# Patient Record
Sex: FEMALE | Race: WHITE | NOT HISPANIC OR LATINO | Employment: FULL TIME | URBAN - METROPOLITAN AREA
[De-identification: names, ages, dates, MRNs, and addresses within clinical notes are randomized per-mention and may not be internally consistent; named-entity substitution may affect disease eponyms.]

---

## 2021-05-26 ENCOUNTER — RECORDS - HEALTHEAST (OUTPATIENT)
Dept: ADMINISTRATIVE | Facility: CLINIC | Age: 51
End: 2021-05-26

## 2021-05-27 ENCOUNTER — RECORDS - HEALTHEAST (OUTPATIENT)
Dept: ADMINISTRATIVE | Facility: CLINIC | Age: 51
End: 2021-05-27

## 2021-05-28 ENCOUNTER — RECORDS - HEALTHEAST (OUTPATIENT)
Dept: ADMINISTRATIVE | Facility: CLINIC | Age: 51
End: 2021-05-28

## 2021-05-29 ENCOUNTER — RECORDS - HEALTHEAST (OUTPATIENT)
Dept: ADMINISTRATIVE | Facility: CLINIC | Age: 51
End: 2021-05-29

## 2024-04-26 ENCOUNTER — APPOINTMENT (OUTPATIENT)
Dept: RADIOLOGY | Facility: CLINIC | Age: 54
End: 2024-04-26
Attending: EMERGENCY MEDICINE
Payer: COMMERCIAL

## 2024-04-26 ENCOUNTER — APPOINTMENT (OUTPATIENT)
Dept: CT IMAGING | Facility: CLINIC | Age: 54
End: 2024-04-26
Attending: EMERGENCY MEDICINE
Payer: COMMERCIAL

## 2024-04-26 ENCOUNTER — HOSPITAL ENCOUNTER (EMERGENCY)
Facility: CLINIC | Age: 54
Discharge: HOME | End: 2024-04-27
Attending: EMERGENCY MEDICINE | Admitting: EMERGENCY MEDICINE
Payer: COMMERCIAL

## 2024-04-26 DIAGNOSIS — S83.411A SPRAIN OF MEDIAL COLLATERAL LIGAMENT OF RIGHT KNEE, INITIAL ENCOUNTER: ICD-10-CM

## 2024-04-26 PROCEDURE — 99284 EMERGENCY DEPT VISIT MOD MDM: CPT | Mod: 25

## 2024-04-26 PROCEDURE — 73700 CT LOWER EXTREMITY W/O DYE: CPT | Mod: RT

## 2024-04-26 PROCEDURE — 73562 X-RAY EXAM OF KNEE 3: CPT | Mod: RT

## 2024-04-26 PROCEDURE — 250N000013 HC RX MED GY IP 250 OP 250 PS 637: Performed by: EMERGENCY MEDICINE

## 2024-04-26 RX ORDER — IBUPROFEN 600 MG/1
600 TABLET, FILM COATED ORAL ONCE
Status: COMPLETED | OUTPATIENT
Start: 2024-04-26 | End: 2024-04-26

## 2024-04-26 RX ADMIN — IBUPROFEN 600 MG: 600 TABLET ORAL at 23:19

## 2024-04-26 ASSESSMENT — ACTIVITIES OF DAILY LIVING (ADL): ADLS_ACUITY_SCORE: 35

## 2024-04-26 NOTE — Clinical Note
Sonya Madrigal was seen and treated in our emergency department on 4/26/2024.  She may return to work on 04/29/2024.  On returning to work patient may require adjustment of work schedule and environment to allow for crutches and limit use of right leg/ambulation while recovering.     If you have any questions or concerns, please don't hesitate to call.      Nishant Hancock MD

## 2024-04-27 VITALS
RESPIRATION RATE: 16 BRPM | DIASTOLIC BLOOD PRESSURE: 81 MMHG | OXYGEN SATURATION: 96 % | HEART RATE: 66 BPM | SYSTOLIC BLOOD PRESSURE: 155 MMHG | WEIGHT: 167 LBS | TEMPERATURE: 97.2 F | HEIGHT: 64 IN | BODY MASS INDEX: 28.51 KG/M2

## 2024-04-27 RX ORDER — HYDROCODONE BITARTRATE AND ACETAMINOPHEN 5; 325 MG/1; MG/1
1 TABLET ORAL EVERY 6 HOURS PRN
Qty: 10 TABLET | Refills: 0 | Status: SHIPPED | OUTPATIENT
Start: 2024-04-27

## 2024-04-27 RX ORDER — HYDROCODONE BITARTRATE AND ACETAMINOPHEN 5; 325 MG/1; MG/1
1 TABLET ORAL EVERY 6 HOURS PRN
Qty: 10 TABLET | Refills: 0 | Status: SHIPPED | OUTPATIENT
Start: 2024-04-27 | End: 2024-04-27

## 2024-04-27 ASSESSMENT — ACTIVITIES OF DAILY LIVING (ADL): ADLS_ACUITY_SCORE: 37

## 2024-04-27 NOTE — ED PROVIDER NOTES
NAME: Sonya Madrigal  AGE: 53 year old female  YOB: 1970  MRN: [id number]  EVALUATION DATE & TIME: 2024 10:45 PM    PCP: Provider, Historical    ED PROVIDER: Nishant Hancock M.D.      Chief Complaint   Patient presents with    Knee Pain         FINAL IMPRESSION:  1. Sprain of medial collateral ligament of right knee, initial encounter      MEDICAL DECISION MAKIN:00 PM I met with the patient, obtained history, performed an initial exam, and discussed options and plan for diagnostics and treatment here in the ED.   11:48 PM I went to update the patient on results.   12:37 AM I went to recheck on the patient and update on results.  We discussed the plan for discharge and the patient is agreeable. Reviewed supportive cares, symptomatic treatment, outpatient follow up, and reasons to return to the Emergency Department. Patient to be discharged by ED RN.    Patient was clinically assessed and consented to treatment. After assessment, medical decision making and workup were discussed with the patient. The patient was agreeable to plan for testing, workup, and treatment.  Pertinent Labs & Imaging studies reviewed. (See chart for details)       Medical Decision Making  Obtained supplemental history:Supplemental history obtained?: No  Reviewed external records: External records reviewed?: Documented in chart  Care impacted by chronic illness:N/A  Care significantly affected by social determinants of health:Access to Medical Care  Did you consider but not order tests?: Work up considered but not performed and documented in chart, if applicable  Did you interpret images independently?: Independent interpretation of ECG and images noted in documentation, when applicable.  Consultation discussion with other provider:Did you involve another provider (consultant, , pharmacy, etc.)?: No  Discharge. I prescribed additional prescription strength medication(s) as charted. See documentation for any  additional details.    Sonya Madrigal is a 53 year old female who presents with right knee pain..   Differential diagnosis includes but not limited to medial collateral ligament strain, medial collateral ligament tear, DVT, tibial plateau avulsion.  Patient with dog bite several months ago but now after massaging the area she had acute pain and now has tenderness over the medial aspect of the tibia.  Patient has some swelling going up over the patella as well.  Unclear what the etiology is as patient did not have any impact or traumatic injury but could have possibly injured the medial collateral ligament or possibly had a tear from the dog bite that she exacerbated on the massaging of the muscle layer.  Patient was sent for x-ray and evaluation.  X-ray did not reveal any acute bony abnormality but did show some calcification and possible patellar fracture though there was some tenderness here it was more over the tibia.  After discussion with patient we will proceed with CT to fully evaluate this given the report from radiology.  CT scan did not show any acute fractures but did show calcification findings of MCL strain or sprain.  Patient has some calcification there which likely is older but could possibly been exacerbated by the aggressive or deeper massage she did to the leg there.  Given the dog bite as well this could have exacerbated some possible sartorius or medial quadriceps muscle injury that was still healing from the dog bite.  Patient's pain controlled here and will plan for follow-up with orthopedics.  Patient placed in knee immobilizer and attempt with crutches were unsuccessful patient will be placed with a walker which she felt more comfortable with.  Patient will use this to help with the rest and limit weightbearing on that leg as well as follow-up with orthopedics for reassessment.    0 minutes of critical care time    MEDICATIONS GIVEN IN THE EMERGENCY:  Medications   ibuprofen (ADVIL/MOTRIN)  "tablet 600 mg (600 mg Oral $Given 4/26/24 3377)       NEW PRESCRIPTIONS STARTED AT TODAY'S ER VISIT:  Discharge Medication List as of 4/27/2024  1:24 AM             =================================================================    HPI    Patient information was obtained from: Patient    Use of : N/A         Sonya Madrigal is a 53 year old female with no past medical history, who presents to the ED via walk-in for the evaluation of knee pain.    Patient reports she was bit by a dog six months ago on right upper leg and developed some bruising that did not fully heal. Two days ago, patient reports she was gently rubbing lotion on her leg and pressed on right upper leg where her bruise was and felt a \"squish, then pop.\" Patient reports that after she felt the pop, she has had increased pain to right upper leg as well as right knee, which she describes as a stinging and warm sensation. Pain is worse with movement. She reports associating right knee swelling as well and reports she is unable to bend her knee. Patient took three Ibuprofen this morning around 0900. No other complaints at this time.    REVIEW OF SYSTEMS   Review of Systems   Musculoskeletal:         Positive for right leg pain, right knee pain and swelling.   All other systems reviewed and are negative.     PAST MEDICAL HISTORY:  History reviewed. No pertinent past medical history.    PAST SURGICAL HISTORY:  History reviewed. No pertinent surgical history.    CURRENT MEDICATIONS:    No current facility-administered medications for this encounter.    Current Outpatient Medications:     HYDROcodone-acetaminophen (NORCO) 5-325 MG tablet, Take 1 tablet by mouth every 6 hours as needed for severe pain, Disp: 10 tablet, Rfl: 0    diazePAM (VALIUM) 5 MG tablet, [DIAZEPAM (VALIUM) 5 MG TABLET] Take 1 tablet (5 mg total) by mouth every 6 (six) hours as needed for muscle spasms., Disp: 15 tablet, Rfl: 0    HYDROcodone-acetaminophen 5-325 mg per tablet, " "[HYDROCODONE-ACETAMINOPHEN 5-325 MG PER TABLET] Take 1-2 tablets by mouth every 6 (six) hours as needed for pain., Disp: 10 tablet, Rfl: 0    ALLERGIES:  Allergies   Allergen Reactions    Aleve [Naproxen]      Itchy throat, tingling in mouth and throat    Percocet [Oxycodone-Acetaminophen] Unknown       FAMILY HISTORY:  History reviewed. No pertinent family history.    SOCIAL HISTORY:   Social History     Socioeconomic History    Marital status:    Tobacco Use    Smoking status: Every Day     Social Determinants of Health      Received from [facility]    Financial Resource Strain    Received from [facility]    Social Connections       PHYSICAL EXAM:    Vitals: BP (!) 155/81   Pulse 66   Temp 97.2  F (36.2  C) (Oral)   Resp 16   Ht 1.626 m (5' 4\")   Wt 75.8 kg (167 lb)   SpO2 96%   BMI 28.67 kg/m     Physical Exam  Vitals and nursing note reviewed.   Constitutional:       General: She is not in acute distress.     Appearance: Normal appearance. She is normal weight. She is not ill-appearing or toxic-appearing.   HENT:      Head: Normocephalic.   Cardiovascular:      Pulses: Normal pulses.   Pulmonary:      Effort: No respiratory distress.   Musculoskeletal:         General: Swelling and tenderness present. No deformity.        Legs:    Skin:     Coloration: Skin is not pale.      Findings: No bruising or erythema.   Neurological:      Mental Status: She is alert.      Sensory: No sensory deficit.   Psychiatric:         Behavior: Behavior normal.           LAB:  All pertinent labs reviewed and interpreted.  Labs Ordered and Resulted from Time of ED Arrival to Time of ED Departure - No data to display    RADIOLOGY:  CT Knee Right w/o Contrast   Final Result   IMPRESSION:   1.  Evidence of old right proximal medial collateral ligament sprain. No patellar fracture or other acute abnormality is present.         XR Knee Right 3 Views   Final " Result   IMPRESSION: A linear lucency is seen through the patella, suspicious for a nondisplaced fracture. No other suspicious osseous findings. No definite knee joint effusion or dislocation. Consider further evaluation with CT of the knee for more detailed    assessment.              PROCEDURES:   Procedures       I, Christina Alonso, am serving as a scribe to document services personally performed by Dr. Nishant Hancock  based on my observation and the provider's statements to me. I, Nishant Hancock MD attest that Christina Alonso is acting in a scribe capacity, has observed my performance of the services and has documented them in accordance with my direction.      Nishant Hancock M.D.  Emergency Medicine  [facility] Emergency Department       Nishant Hancock MD  04/27/24 0652

## 2024-04-27 NOTE — ED NOTES
Instructed patient on using crutches and knee immobilizer. Writer and patient spent some time demonstrating and verbilzing understanding of both forms of teaching. No issues or questions present at discharge. Patient wheeled to her car.

## 2024-04-27 NOTE — ED TRIAGE NOTES
" Pt presents with complaints of putting lotion on knee 2 days ago and felt a \"pop\". Pt states she was bit on inside of knee in November by dog and bruising is still there and it has not fully healed. Pt states pain is worse when standing and walking.         "

## 2024-04-27 NOTE — DISCHARGE INSTRUCTIONS
Recommend nonweightbearing with crutches for at least 7 days until follow-up with orthopedics.  Also recommend use of knee immobilizer to protect your right knee from further injury while awaiting evaluation with orthopedics for acute injury to the medial collateral ligament on top of older injury evident on CT.

## 2024-07-21 ENCOUNTER — HEALTH MAINTENANCE LETTER (OUTPATIENT)
Age: 54
End: 2024-07-21

## 2025-08-10 ENCOUNTER — HEALTH MAINTENANCE LETTER (OUTPATIENT)
Age: 55
End: 2025-08-10